# Patient Record
Sex: FEMALE | Race: BLACK OR AFRICAN AMERICAN | Employment: FULL TIME | URBAN - METROPOLITAN AREA
[De-identification: names, ages, dates, MRNs, and addresses within clinical notes are randomized per-mention and may not be internally consistent; named-entity substitution may affect disease eponyms.]

---

## 2023-02-20 ENCOUNTER — APPOINTMENT (OUTPATIENT)
Dept: GENERAL RADIOLOGY | Age: 38
End: 2023-02-20
Payer: COMMERCIAL

## 2023-02-20 ENCOUNTER — HOSPITAL ENCOUNTER (EMERGENCY)
Age: 38
Discharge: HOME | End: 2023-02-20
Attending: EMERGENCY MEDICINE
Payer: COMMERCIAL

## 2023-02-20 VITALS
WEIGHT: 247 LBS | RESPIRATION RATE: 14 BRPM | TEMPERATURE: 98.1 F | BODY MASS INDEX: 39.7 KG/M2 | DIASTOLIC BLOOD PRESSURE: 63 MMHG | OXYGEN SATURATION: 100 % | SYSTOLIC BLOOD PRESSURE: 131 MMHG | HEIGHT: 66 IN | HEART RATE: 80 BPM

## 2023-02-20 DIAGNOSIS — M79.605 LEFT LEG PAIN: Primary | ICD-10-CM

## 2023-02-20 LAB — HCG(URINE) PREGNANCY TEST: NEGATIVE

## 2023-02-20 PROCEDURE — 6360000002 HC RX W HCPCS: Performed by: EMERGENCY MEDICINE

## 2023-02-20 PROCEDURE — 96372 THER/PROPH/DIAG INJ SC/IM: CPT

## 2023-02-20 PROCEDURE — 73502 X-RAY EXAM HIP UNI 2-3 VIEWS: CPT

## 2023-02-20 PROCEDURE — 84703 CHORIONIC GONADOTROPIN ASSAY: CPT

## 2023-02-20 PROCEDURE — 99284 EMERGENCY DEPT VISIT MOD MDM: CPT

## 2023-02-20 PROCEDURE — 6370000000 HC RX 637 (ALT 250 FOR IP): Performed by: EMERGENCY MEDICINE

## 2023-02-20 RX ORDER — METHOCARBAMOL 750 MG/1
750-1500 TABLET, FILM COATED ORAL 3 TIMES DAILY
Qty: 15 TABLET | Refills: 0 | Status: SHIPPED | OUTPATIENT
Start: 2023-02-20 | End: 2023-02-23 | Stop reason: SDUPTHER

## 2023-02-20 RX ORDER — KETOROLAC TROMETHAMINE 30 MG/ML
30 INJECTION, SOLUTION INTRAMUSCULAR; INTRAVENOUS ONCE
Status: COMPLETED | OUTPATIENT
Start: 2023-02-20 | End: 2023-02-20

## 2023-02-20 RX ORDER — PREDNISONE 20 MG/1
40 TABLET ORAL DAILY
Qty: 8 TABLET | Refills: 0 | Status: SHIPPED | OUTPATIENT
Start: 2023-02-20 | End: 2023-02-24

## 2023-02-20 RX ORDER — PREDNISONE 20 MG/1
60 TABLET ORAL ONCE
Status: COMPLETED | OUTPATIENT
Start: 2023-02-20 | End: 2023-02-20

## 2023-02-20 RX ADMIN — PREDNISONE 60 MG: 20 TABLET ORAL at 22:59

## 2023-02-20 RX ADMIN — KETOROLAC TROMETHAMINE 30 MG: 30 INJECTION, SOLUTION INTRAMUSCULAR at 21:49

## 2023-02-20 ASSESSMENT — PAIN - FUNCTIONAL ASSESSMENT
PAIN_FUNCTIONAL_ASSESSMENT: 0-10
PAIN_FUNCTIONAL_ASSESSMENT: 0-10
PAIN_FUNCTIONAL_ASSESSMENT: PREVENTS OR INTERFERES SOME ACTIVE ACTIVITIES AND ADLS
PAIN_FUNCTIONAL_ASSESSMENT: ACTIVITIES ARE NOT PREVENTED

## 2023-02-20 ASSESSMENT — PAIN DESCRIPTION - DESCRIPTORS
DESCRIPTORS: SHOOTING
DESCRIPTORS: SHOOTING

## 2023-02-20 ASSESSMENT — PAIN DESCRIPTION - PAIN TYPE
TYPE: ACUTE PAIN
TYPE: ACUTE PAIN

## 2023-02-20 ASSESSMENT — PAIN DESCRIPTION - FREQUENCY
FREQUENCY: INTERMITTENT
FREQUENCY: INTERMITTENT

## 2023-02-20 ASSESSMENT — PAIN DESCRIPTION - ORIENTATION
ORIENTATION: LEFT
ORIENTATION: LEFT

## 2023-02-20 ASSESSMENT — PAIN DESCRIPTION - ONSET: ONSET: SUDDEN

## 2023-02-20 ASSESSMENT — PAIN DESCRIPTION - LOCATION
LOCATION: LEG;HIP
LOCATION: LEG

## 2023-02-20 ASSESSMENT — PAIN SCALES - GENERAL
PAINLEVEL_OUTOF10: 8
PAINLEVEL_OUTOF10: 4

## 2023-02-20 NOTE — Clinical Note
Otoniel Edmonds was seen and treated in our emergency department on 2/20/2023. She may return to work on 02/23/2023. If you have any questions or concerns, please don't hesitate to call.       Lee Clancy MD

## 2023-02-21 ENCOUNTER — HOSPITAL ENCOUNTER (OUTPATIENT)
Dept: VASCULAR LAB | Age: 38
Discharge: HOME | End: 2023-02-21
Payer: COMMERCIAL

## 2023-02-21 DIAGNOSIS — M79.605 LEFT LEG PAIN: ICD-10-CM

## 2023-02-21 PROCEDURE — 93971 EXTREMITY STUDY: CPT

## 2023-02-21 NOTE — ED NOTES
Patient prepared for and ready to be discharged. Patient discharged at this time in no acute distress after verbalizing understanding of discharge instructions. Patient left after receiving After Visit Summary instructions.         Rosy Smyth RN  02/20/23 6839

## 2023-02-21 NOTE — ED PROVIDER NOTES
[facility] Emergency Department      Pt Name: Sae Torre  MRN: [id number]  [place] 1985  Date of evaluation: 2/20/2023  Provider: Nicky Saez MD  CHIEF COMPLAINT  Chief Complaint   Patient presents with    Leg Pain     Leg pain     HPI  Sae Torre is a 41 y.o. female who presents because of leg pain. She has had pain for the past several days. It is in her left hip area and radiates down the lateral part of her leg. Denies any known injury. It does feel worse to sit down and then stand up. She denies any numbness. Denies any abdominal pain. Denies any loss of bowel or bladder control. Denies fever. She did wait to the end of her shift at work and came to the ER for evaluation. She does have a history of DVT during pregnancy and has been off of blood thinners since the end of her pregnancy. REVIEW OF SYSTEMS:  No fever, no cough, no abdominal pain, no dysuria Pertinent positives and negatives as per the HPI. All other pertinent review of systems reviewed and negative. Nursing notes reviewed. PAST MEDICAL HISTORY  Past Medical History:   Diagnosis Date    Kidney infection     UTI (urinary tract infection)      SURGICAL HISTORY  History reviewed. No pertinent surgical history. MEDICATIONS:  No current facility-administered medications on file prior to encounter. Current Outpatient Medications on File Prior to Encounter   Medication Sig Dispense Refill    naproxen (NAPROSYN) 500 MG tablet Take 1 tablet by mouth 2 times daily (Patient not taking: Reported on 2/20/2023) 60 tablet 0    ibuprofen (ADVIL;MOTRIN) 600 MG tablet Take 1 tablet by mouth every 6 hours as needed for Pain (Patient not taking: Reported on 2/20/2023) 30 tablet 0    etonogestrel-ethinyl estradiol (NUVARING) 0.12-0.015 MG/24HR vaginal ring Place 1 each vaginally See Admin Instructions. (Patient not taking: Reported on 2/20/2023)       ALLERGIES  Corylus and Nuts [peanut oil]  FAMILY HISTORY:  History reviewed. No pertinent family history. SOCIAL HISTORY:  Social History     Tobacco Use    Smoking status: Never   Substance Use Topics    Alcohol use: Yes     Comment: socially    Drug use: No     IMMUNIZATIONS:  Noncontributory    PHYSICAL EXAM  VITAL SIGNS:  Blood pressure 131/63, pulse 80, temperature 98.1 °F (36.7 °C), temperature source Oral, resp. rate 14, height 5' 6\" (1.676 m), weight 247 lb (112 kg), last menstrual period 02/11/2023, SpO2 100 %. Constitutional:  41 y.o. female who does not appear toxic or acutely ill  HENT:  Atraumatic, mucous membranes moist  Eyes:   Conjunctiva clear, no icterus  Neck:  Supple, no visible JVD  Cardiovascular:  Regular, no rubs  Thorax & Lungs:  No accessory muscle usage, clear  Abdomen:  Soft, non distended, NT  Back:  No deformity  Genitalia:  Deferred  Rectal:  Deferred  Extremities:  No cyanosis, no edema, no palpable venous cords, pain with flex, extension  Skin:  Warm, dry  Neurologic:  Alert, no slurred speech, light touch sensation intact, df/pf normal, steady gait, pain with straight leg raise  Psychiatric:  Affect appropriate    RESULTS / MEDICAL DECISION MAKING:  Labs resulted at the time of this note reviewed. Labs Reviewed   PREGNANCY, URINE     RADIOLOGY:  Plain x-rays were viewed by me:   XR HIP 2-3 VW W PELVIS LEFT   Final Result      No acute fracture seen. VL Extremity Venous Left    (Results Pending)     ED COURSE:    Medications   ketorolac (TORADOL) injection 30 mg (30 mg IntraMUSCular Given 2/20/23 2149)   predniSONE (DELTASONE) tablet 60 mg (60 mg Oral Given 2/20/23 2259)     History from : Patient  Limitations to history : None  Chronic Conditions:  has a past medical history of Kidney infection and UTI (urinary tract infection). Records Reviewed :  Outpatient Notes    Disposition Considerations (Tests not ordered but considered, Shared Decision Making, Pt Expectation of Test or Tx.):  MRI not deemed clinically necessary in the ED setting  Discussion with Other Profesionals : None    PROCEDURES:  None    CRITICAL CARE:  None    CONSULTATIONS:  None    Riki Alberto is a 41 y.o. female who presented because of leg pain. She is here during timeframe that we are not able to obtain official Doppler ultrasound of her leg at the [facility] hospital.  We advised her to go there tomorrow during hours when the test is available. There are no clinical findings to suggest of DVT, vascular occlusion or dissection, compartment syndrome, infectious process, fracture, etc.  Other differentials include but not limited to peripheral neuropathic pain, strain, sprain, arthritis, bursitis, tendonitis, contusion, spasm, radiculopathy. Riki Alberto was given appropriate discharge instructions. Referral to follow up provider. Discharge Medication List as of 2/20/2023 10:41 PM        START taking these medications    Details   predniSONE (DELTASONE) 20 MG tablet Take 2 tablets by mouth daily for 4 days, Disp-8 tablet, R-0Print      methocarbamol (ROBAXIN-750) 750 MG tablet Take 1-2 tablets by mouth 3 times daily for 15 doses, Disp-15 tablet, R-0Print           FOLLOW UP:    [place] [id number]  [phone]    Schedule an appointment as soon as possible for a visit       FINAL IMPRESSION:    1. Left leg pain        (Please note that I used voice recognition software to generate this note.   Occasionally words are mistranscribed despite my efforts to edit errors.)        Jae Laguna MD  02/21/23 0030

## 2023-02-23 ENCOUNTER — OFFICE VISIT (OUTPATIENT)
Dept: INTERNAL MEDICINE CLINIC | Age: 38
End: 2023-02-23
Payer: COMMERCIAL

## 2023-02-23 ENCOUNTER — HOSPITAL ENCOUNTER (OUTPATIENT)
Dept: GENERAL RADIOLOGY | Age: 38
Discharge: HOME | End: 2023-02-23
Payer: COMMERCIAL

## 2023-02-23 VITALS
BODY MASS INDEX: 39.74 KG/M2 | HEIGHT: 66 IN | TEMPERATURE: 97.8 F | SYSTOLIC BLOOD PRESSURE: 138 MMHG | RESPIRATION RATE: 16 BRPM | HEART RATE: 83 BPM | OXYGEN SATURATION: 98 % | DIASTOLIC BLOOD PRESSURE: 82 MMHG | WEIGHT: 247.3 LBS

## 2023-02-23 DIAGNOSIS — M54.10 BACK PAIN WITH LEFT-SIDED RADICULOPATHY: ICD-10-CM

## 2023-02-23 DIAGNOSIS — M54.10 BACK PAIN WITH LEFT-SIDED RADICULOPATHY: Primary | ICD-10-CM

## 2023-02-23 DIAGNOSIS — Z00.00 HEALTH CARE MAINTENANCE: ICD-10-CM

## 2023-02-23 DIAGNOSIS — E03.9 HYPOTHYROID IN PREGNANCY, ANTEPARTUM: ICD-10-CM

## 2023-02-23 DIAGNOSIS — O99.280 HYPOTHYROID IN PREGNANCY, ANTEPARTUM: ICD-10-CM

## 2023-02-23 LAB
ANION GAP SERPL CALCULATED.3IONS-SCNC: 12 MMOL/L (ref 3–16)
BASOPHILS ABSOLUTE: 0 K/UL (ref 0–0.2)
BASOPHILS RELATIVE PERCENT: 0 %
BUN BLDV-MCNC: 16 MG/DL (ref 7–20)
CALCIUM SERPL-MCNC: 9.4 MG/DL (ref 8.3–10.6)
CHLORIDE BLD-SCNC: 106 MMOL/L (ref 99–110)
CO2: 25 MMOL/L (ref 21–32)
CREAT SERPL-MCNC: 0.9 MG/DL (ref 0.6–1.1)
EOSINOPHILS ABSOLUTE: 0.1 K/UL (ref 0–0.6)
EOSINOPHILS RELATIVE PERCENT: 1 %
GFR SERPL CREATININE-BSD FRML MDRD: >60 ML/MIN/{1.73_M2}
GLUCOSE BLD-MCNC: 98 MG/DL (ref 70–99)
HCT VFR BLD CALC: 36.6 % (ref 36–48)
HEMATOLOGY PATH CONSULT: YES
HEMOGLOBIN: 11.4 G/DL (ref 12–16)
LYMPHOCYTES ABSOLUTE: 5.9 K/UL (ref 1–5.1)
LYMPHOCYTES RELATIVE PERCENT: 45 %
MCH RBC QN AUTO: 26.5 PG (ref 26–34)
MCHC RBC AUTO-ENTMCNC: 31.1 G/DL (ref 31–36)
MCV RBC AUTO: 85.4 FL (ref 80–100)
MONOCYTES ABSOLUTE: 0.4 K/UL (ref 0–1.3)
MONOCYTES RELATIVE PERCENT: 3 %
NEUTROPHILS ABSOLUTE: 6.7 K/UL (ref 1.7–7.7)
NEUTROPHILS RELATIVE PERCENT: 51 %
PDW BLD-RTO: 15 % (ref 12.4–15.4)
PLATELET # BLD: 501 K/UL (ref 135–450)
PLATELET SLIDE REVIEW: ABNORMAL
PMV BLD AUTO: 7.4 FL (ref 5–10.5)
POTASSIUM SERPL-SCNC: 4.1 MMOL/L (ref 3.5–5.1)
RBC # BLD: 4.28 M/UL (ref 4–5.2)
RBC # BLD: NORMAL 10*6/UL
SLIDE REVIEW: ABNORMAL
SODIUM BLD-SCNC: 143 MMOL/L (ref 136–145)
WBC # BLD: 13.1 K/UL (ref 4–11)

## 2023-02-23 PROCEDURE — 6360000002 HC RX W HCPCS

## 2023-02-23 PROCEDURE — 72100 X-RAY EXAM L-S SPINE 2/3 VWS: CPT

## 2023-02-23 PROCEDURE — 99213 OFFICE O/P EST LOW 20 MIN: CPT

## 2023-02-23 RX ORDER — KETOROLAC TROMETHAMINE 30 MG/ML
30 INJECTION, SOLUTION INTRAMUSCULAR; INTRAVENOUS ONCE
Status: COMPLETED | OUTPATIENT
Start: 2023-02-23 | End: 2023-02-23

## 2023-02-23 RX ORDER — METHOCARBAMOL 750 MG/1
750-1500 TABLET, FILM COATED ORAL 3 TIMES DAILY
Qty: 90 TABLET | Refills: 0 | Status: SHIPPED | OUTPATIENT
Start: 2023-02-23 | End: 2023-03-10

## 2023-02-23 RX ORDER — TRIAMCINOLONE ACETONIDE 40 MG/ML
40 INJECTION, SUSPENSION INTRA-ARTICULAR; INTRAMUSCULAR ONCE
Status: COMPLETED | OUTPATIENT
Start: 2023-02-23 | End: 2023-02-23

## 2023-02-23 RX ADMIN — KETOROLAC TROMETHAMINE 30 MG: 30 INJECTION, SOLUTION INTRAMUSCULAR; INTRAVENOUS at 09:48

## 2023-02-23 RX ADMIN — TRIAMCINOLONE ACETONIDE 40 MG: 40 INJECTION, SUSPENSION INTRA-ARTICULAR; INTRAMUSCULAR at 09:48

## 2023-02-23 ASSESSMENT — PAIN DESCRIPTION - LOCATION: LOCATION: LEG

## 2023-02-23 ASSESSMENT — ENCOUNTER SYMPTOMS
NAUSEA: 0
BACK PAIN: 1
CHEST TIGHTNESS: 0
VOMITING: 0
SHORTNESS OF BREATH: 0

## 2023-02-23 ASSESSMENT — PAIN DESCRIPTION - ORIENTATION: ORIENTATION: LEFT;ANTERIOR;DISTAL

## 2023-02-23 ASSESSMENT — PAIN SCALES - GENERAL: PAINLEVEL_OUTOF10: 6

## 2023-02-23 ASSESSMENT — PAIN DESCRIPTION - DESCRIPTORS: DESCRIPTORS: ACHING;NUMBNESS;SHOOTING

## 2023-02-23 NOTE — PROGRESS NOTES
The [facility]    Darlene Maier is a 41 y.o. female, here for evaluation of the following concerns:    HPI  45-year-old female with past medical history UTI, obesity, A0 hypothyroid and DVT during pregnancy presents to clinic as follow-up from recent ED visit. Patient states she is from sleep  with sudden onset, moderate to severe, shooting, throbbing pain to her left lower back hip radiating to her anterior left thigh. Pain exacerbated with sitting and somewhat relieved with standing, although she states pain radiates further down her leg when standing. She denies any history of similar symptoms or recent injury/falls. She does states she was dancing with friends Saturday night, but denies any strenuous activity. Pain continued without relief on the following day causing significant difficulty at work. She was seen in the ED that evening where she received Toradol shot with minimal relief and had x-ray of left hip which was unremarkable. She was discharged on several days 40 prednisone as well as Robaxin. States pain is somewhat improved since then and presents now for further evaluation. Of note, due to patient's history of DVT during pregnancy she was referred for left lower extremity Doppler which was negative. Patient sitting in chair comfortably on assessment. She endorses continued left hip pain radiating to her anterior left thigh. She additionally states she noticed numbness to her distal anterior left thigh during her Doppler examination. Review of Systems   Constitutional:  Negative for chills and fever. Respiratory:  Negative for chest tightness and shortness of breath. Gastrointestinal:  Negative for nausea and vomiting. Musculoskeletal:  Positive for back pain. Negative for gait problem, joint swelling and neck pain. Neurological:  Positive for numbness.    All other systems reviewed and are negative. MEDICATIONS:  Prior to Visit Medications    Medication Sig Taking? Authorizing Provider   methocarbamol (ROBAXIN-750) 750 MG tablet Take 1-2 tablets by mouth 3 times daily for 15 days Yes Danae Oshea MD   predniSONE (DELTASONE) 20 MG tablet Take 2 tablets by mouth daily for 4 days Yes Lee Clancy MD        Vitals:    02/23/23 0824   BP: 138/82   Site: Right Upper Arm   Position: Sitting   Cuff Size: Large Adult   Pulse: 83   Resp: 16   Temp: 97.8 °F (36.6 °C)   TempSrc: Temporal   SpO2: 98%   Weight: 247 lb 4.8 oz (112.2 kg)   Height: 5' 6.25\" (1.683 m)      Estimated body mass index is 39.61 kg/m² as calculated from the following:    Height as of this encounter: 5' 6.25\" (1.683 m). Weight as of this encounter: 247 lb 4.8 oz (112.2 kg). Physical Exam  Vitals and nursing note reviewed. Constitutional:       General: She is not in acute distress. Appearance: Normal appearance. She is obese. She is not ill-appearing or diaphoretic. HENT:      Head: Normocephalic. Nose: Nose normal.      Mouth/Throat:      Mouth: Mucous membranes are moist.      Pharynx: Oropharynx is clear. Cardiovascular:      Rate and Rhythm: Normal rate and regular rhythm. Pulses: Normal pulses. Heart sounds: Normal heart sounds. No murmur heard. No friction rub. No gallop. Pulmonary:      Effort: Pulmonary effort is normal. No respiratory distress. Breath sounds: Normal breath sounds. No wheezing, rhonchi or rales. Abdominal:      General: Abdomen is flat. There is no distension. Palpations: Abdomen is soft. Musculoskeletal:      Right lower leg: No edema. Left lower leg: No edema. Comments: Mild tenderness over lateral and anterior L hip. Pain reproduced with L straight leg raise and external hip rotation. Full ROM without pain on R. Skin:     General: Skin is warm and dry. Coloration: Skin is not pale.    Neurological:      Mental Status: She is alert and oriented to person, place, and time. Comments: Numbness over distal anterior L thigh       ASSESSMENT/PLAN:     1. Back pain with left-sided radiculopathy  Assessment & Plan:  - Reproduced with straight leg raise, external hip rotation  - Discussed diet, exercise and encouraged lifestyle changes  - Provided sciatica exercise instructions  - Close follow up; discussed signs/symptoms requiring medical evaluation  Orders:  -     XR LUMBAR SPINE (2-3 VIEWS); Future  2. Hypothyroid in pregnancy, antepartum  Assessment & Plan:  - Previously resolved  - Recheck TSH  Orders:  -     TSH with Reflex; Future  3. Health care maintenance  Assessment & Plan:  - Obtain CBC, BMP, A1c  Orders:  -     CBC with Auto Differential; Future  -     Basic Metabolic Panel; Future      Return in about 2 weeks (around 3/9/2023). The patient was staffed with teaching attending: Dr. Sonja Springer. An electronic signature was used to authenticate this note.     --Citlali Long MD

## 2023-02-23 NOTE — PATIENT INSTRUCTIONS
Thank you for visiting the [address] [place] [address] Ave.. The following issues were addressed and changes made as follows:    Lumbar back pain   Kenolog/Ketorolac intramucular injection  Refill Robaxin   Obtain Lumbar Xray  Encouraged weight loss, exercises    Follow up has been arranged for you in 2 weeks. Please contact the clinic with any questions/concerns or difficulties obtaining your medications.

## 2023-02-23 NOTE — ASSESSMENT & PLAN NOTE
- Reproduced with straight leg raise, external hip rotation  - Discussed diet, exercise and encouraged lifestyle changes  - Provided sciatica exercise instructions  - Close follow up; discussed signs/symptoms requiring medical evaluation

## 2023-02-24 LAB
HEMATOLOGY PATH CONSULT: NORMAL
TSH REFLEX: 1.7 UIU/ML (ref 0.27–4.2)

## 2023-03-09 ENCOUNTER — OFFICE VISIT (OUTPATIENT)
Dept: INTERNAL MEDICINE CLINIC | Age: 38
End: 2023-03-09
Payer: COMMERCIAL

## 2023-03-09 VITALS
TEMPERATURE: 97.5 F | RESPIRATION RATE: 16 BRPM | HEIGHT: 66 IN | DIASTOLIC BLOOD PRESSURE: 78 MMHG | BODY MASS INDEX: 39.66 KG/M2 | OXYGEN SATURATION: 99 % | HEART RATE: 83 BPM | SYSTOLIC BLOOD PRESSURE: 115 MMHG | WEIGHT: 246.8 LBS

## 2023-03-09 DIAGNOSIS — M54.16 LUMBAR RADICULOPATHY: Primary | ICD-10-CM

## 2023-03-09 PROCEDURE — 99213 OFFICE O/P EST LOW 20 MIN: CPT

## 2023-03-09 ASSESSMENT — ENCOUNTER SYMPTOMS: BACK PAIN: 0

## 2023-03-09 NOTE — PROGRESS NOTES
The [facility] Outpatient [facility]    Gil Client is a 41 y.o. female, here for evaluation of the following concerns:    HPI  65-year-old female with past medical history UTI, obesity, G6 hypothyroid and DVT during pregnancy presents to clinic as follow-up for her back pain. Patient was last seen 2 weeks ago for lower back and left lower extremity pain accompanied by left lower extremity paresthesias. She was educated on taking her Robaxin, given Kenalog/Toradol shot, and provided instructions on home exercises and stretches. She states that her pain has since improved significantly. She denies any pain unless she has a long strenuous day at work. However, she continues to complain of left medial thigh paresthesias. Symptoms are constant and have no exacerbating or relieving factors. She denies any incontinence, saddle anesthesia, lower extremity weakness. Discussed options including continued exercises and stretching as well as MRI to evaluate for possible stenosis. She would like to obtain MRI at this time and will follow-up with us after obtaining images. No other complaints or concerns at this time. Review of Systems   Musculoskeletal:  Negative for back pain, gait problem and myalgias. Neurological:  Positive for numbness. Negative for weakness. All other systems reviewed and are negative. MEDICATIONS:  Prior to Visit Medications    Medication Sig Taking?  Authorizing Provider   methocarbamol (ROBAXIN-750) 750 MG tablet Take 1-2 tablets by mouth 3 times daily for 15 days Yes Rodriguez Schultz MD        Vitals:    03/09/23 0828   BP: 115/78   Site: Right Upper Arm   Position: Sitting   Cuff Size: Large Adult   Pulse: 83   Resp: 16   Temp: 97.5 °F (36.4 °C)   TempSrc: Temporal   SpO2: 99%   Weight: 246 lb 12.8 oz (111.9 kg)   Height: 5' 6.25\" (1.683 m)      Estimated body mass index is 39.53 kg/m² as calculated from the following:    Height as of this encounter: 5' 6.25\" (1.683 m). Weight as of this encounter: 246 lb 12.8 oz (111.9 kg). Physical Exam  Vitals and nursing note reviewed. Constitutional:       Appearance: Normal appearance. HENT:      Head: Normocephalic. Nose: Nose normal.      Mouth/Throat:      Mouth: Mucous membranes are moist.      Pharynx: Oropharynx is clear. Cardiovascular:      Rate and Rhythm: Normal rate and regular rhythm. Pulses: Normal pulses. Heart sounds: Normal heart sounds. No murmur heard. No friction rub. No gallop. Pulmonary:      Effort: Pulmonary effort is normal. No respiratory distress. Breath sounds: Normal breath sounds. No wheezing, rhonchi or rales. Abdominal:      General: Abdomen is flat. There is no distension. Musculoskeletal:      Right lower leg: No edema. Left lower leg: No edema. Skin:     General: Skin is warm and dry. Neurological:      Mental Status: She is alert and oriented to person, place, and time. Comments: Subjective medial proximal LLE numbness       ASSESSMENT/PLAN:     1. Lumbar radiculopathy  Assessment & Plan:  - Pain controlled at this time  - Continue Robaxin, exercises  - Obtain MRI, f/u after in clinic  Orders:  -     MRI [address]; Future    Return if symptoms worsen or fail to improve. Patient will call to arrange follow up after obtaining MRI. The patient was staffed with teaching attending: Dr. Saadia Houston. An electronic signature was used to authenticate this note.     --Stan Egan MD

## 2023-03-09 NOTE — PATIENT INSTRUCTIONS
Thank you for visiting the [address] [place] [address] Ave.. The following issues were addressed and changes made as follows:    Lumbar radiculopathy  - MRI ordered     Please call the clinic to schedule follow up after you obtain your MRI scan. Please contact the clinic with any questions/concerns or difficulties obtaining your medications.

## 2023-03-17 ENCOUNTER — HOSPITAL ENCOUNTER (OUTPATIENT)
Dept: MRI IMAGING | Age: 38
Discharge: HOME | End: 2023-03-17
Payer: COMMERCIAL

## 2023-03-17 DIAGNOSIS — M54.16 LUMBAR RADICULOPATHY: ICD-10-CM

## 2023-03-17 PROCEDURE — 72148 MRI LUMBAR SPINE W/O DYE: CPT

## 2023-03-25 PROBLEM — Z00.00 HEALTH CARE MAINTENANCE: Status: RESOLVED | Noted: 2023-02-23 | Resolved: 2023-03-25

## 2023-05-31 ENCOUNTER — TELEPHONE (OUTPATIENT)
Dept: INTERNAL MEDICINE CLINIC | Age: 38
End: 2023-05-31

## 2023-06-08 ENCOUNTER — OFFICE VISIT (OUTPATIENT)
Dept: INTERNAL MEDICINE CLINIC | Age: 38
End: 2023-06-08
Payer: COMMERCIAL

## 2023-06-08 VITALS
DIASTOLIC BLOOD PRESSURE: 74 MMHG | OXYGEN SATURATION: 99 % | BODY MASS INDEX: 36.75 KG/M2 | TEMPERATURE: 97.1 F | WEIGHT: 229.4 LBS | RESPIRATION RATE: 16 BRPM | SYSTOLIC BLOOD PRESSURE: 113 MMHG | HEART RATE: 83 BPM

## 2023-06-08 DIAGNOSIS — Z00.00 HEALTHCARE MAINTENANCE: ICD-10-CM

## 2023-06-08 DIAGNOSIS — M54.16 LUMBAR RADICULOPATHY: Primary | ICD-10-CM

## 2023-06-08 LAB — HBA1C MFR BLD: 4.9 %

## 2023-06-08 PROCEDURE — 99213 OFFICE O/P EST LOW 20 MIN: CPT

## 2023-06-08 PROCEDURE — 83036 HEMOGLOBIN GLYCOSYLATED A1C: CPT

## 2023-06-08 NOTE — PROGRESS NOTES
to light. Cardiovascular:      Rate and Rhythm: Normal rate and regular rhythm. Pulses: Normal pulses. Heart sounds: Normal heart sounds. Pulmonary:      Effort: Pulmonary effort is normal.      Breath sounds: Normal breath sounds. Abdominal:      General: There is no distension. Palpations: Abdomen is soft. Tenderness: There is no abdominal tenderness. Musculoskeletal:         General: Normal range of motion. Right lower leg: No edema. Left lower leg: No edema. Skin:     General: Skin is warm. Neurological:      General: No focal deficit present. Mental Status: She is alert and oriented to person, place, and time. Psychiatric:         Mood and Affect: Mood normal.         Thought Content: Thought content normal.       Assessment & Plan:      1. Lumbar radiculopathy  MRI lumbar spine without contrast with no significant lumbar spinal canal stenosis, mild to moderate left L3-L4 and mild bilateral L4-L5 neural foraminal narrowing  Pain is well controlled at this time  -Continue using Robaxin as needed  -Advised patient on back strengthening exercises  -Advised on correct posture      Return in about 4 months (around 10/8/2023).     Dispo: Pt has been staffed with Dr. Angelika White MD  _______________  Karina Velazquez MD, 6/8/2023 1:48 PM   PGY-2

## 2023-09-22 ENCOUNTER — HOSPITAL ENCOUNTER (EMERGENCY)
Age: 38
Discharge: HOME | End: 2023-09-22
Payer: COMMERCIAL

## 2023-09-22 VITALS
SYSTOLIC BLOOD PRESSURE: 140 MMHG | HEART RATE: 89 BPM | HEIGHT: 65 IN | BODY MASS INDEX: 38.24 KG/M2 | TEMPERATURE: 98 F | RESPIRATION RATE: 16 BRPM | WEIGHT: 229.5 LBS | DIASTOLIC BLOOD PRESSURE: 98 MMHG | OXYGEN SATURATION: 99 %

## 2023-09-22 DIAGNOSIS — H18.892 CORNEAL IRRITATION OF LEFT EYE: Primary | ICD-10-CM

## 2023-09-22 PROCEDURE — 99283 EMERGENCY DEPT VISIT LOW MDM: CPT

## 2023-09-22 PROCEDURE — 2500000003 HC RX 250 WO HCPCS

## 2023-09-22 PROCEDURE — 6370000000 HC RX 637 (ALT 250 FOR IP)

## 2023-09-22 RX ORDER — ERYTHROMYCIN 5 MG/G
OINTMENT OPHTHALMIC
Qty: 1 EACH | Refills: 0 | Status: SHIPPED | OUTPATIENT
Start: 2023-09-22 | End: 2023-10-02

## 2023-09-22 RX ORDER — ERYTHROMYCIN 5 MG/G
OINTMENT OPHTHALMIC ONCE
Status: COMPLETED | OUTPATIENT
Start: 2023-09-22 | End: 2023-09-22

## 2023-09-22 RX ORDER — ACETAMINOPHEN 325 MG/1
650 TABLET ORAL ONCE
Status: COMPLETED | OUTPATIENT
Start: 2023-09-22 | End: 2023-09-22

## 2023-09-22 RX ORDER — PROPARACAINE HYDROCHLORIDE 5 MG/ML
1 SOLUTION/ DROPS OPHTHALMIC ONCE
Status: COMPLETED | OUTPATIENT
Start: 2023-09-22 | End: 2023-09-22

## 2023-09-22 RX ADMIN — ERYTHROMYCIN: 5 OINTMENT OPHTHALMIC at 08:50

## 2023-09-22 RX ADMIN — PROPARACAINE HYDROCHLORIDE 1 DROP: 5 SOLUTION/ DROPS OPHTHALMIC at 06:39

## 2023-09-22 RX ADMIN — ACETAMINOPHEN 650 MG: 325 TABLET ORAL at 06:39

## 2023-09-22 RX ADMIN — FLUORESCEIN SODIUM 1 MG: 1 STRIP OPHTHALMIC at 07:00

## 2023-09-22 ASSESSMENT — VISUAL ACUITY
OS: 20/40
OD: 20/15
OU: 20/30

## 2023-09-22 ASSESSMENT — PAIN SCALES - GENERAL: PAINLEVEL_OUTOF10: 10

## 2023-09-22 ASSESSMENT — PAIN DESCRIPTION - ORIENTATION: ORIENTATION: LEFT

## 2023-09-22 ASSESSMENT — PAIN - FUNCTIONAL ASSESSMENT: PAIN_FUNCTIONAL_ASSESSMENT: NONE - DENIES PAIN

## 2023-09-22 ASSESSMENT — PAIN DESCRIPTION - LOCATION: LOCATION: EYE

## 2023-09-22 NOTE — ED PROVIDER NOTES
[address] [facility] [address] [address]        Pt Name: Pascual Peña  MRN: [id number]  [address] [facility] 1985  Date of evaluation: 9/22/2023  Provider: CONNIE Yuan - ANDRÉS  PCP: Evette Escalante MD  Note Started: 6:42 AM EDT 9/22/23      DIONNA. I have evaluated this patient. CHIEF COMPLAINT       Chief Complaint   Patient presents with    Eye Pain     Patient presents to ED for c/o possible hair spray in left eye from Wednesday evening. Patient with c/o pain, blurred vision and sensitivity to light. Patient states she has tried flushing her eye, took her contacts out and took her lashes off, but still having pain. HISTORY OF PRESENT ILLNESS: 1 or more Elements     History From: pt            Chief Complaint:above    Pascual Peña is a 41 y.o. female who  has a past medical history of Kidney infection and UTI (urinary tract infection). presents to ed with left eye pain. Started Wednesday. Was getting hair done and got some type of spray which makes hair \"firm\" . Does not know exactly what it was. Now having pain and worsening photophobia. Nothing makes symptoms better. Light makes worse    The patient  reports that she has never smoked. She has never used smokeless tobacco. She reports current alcohol use. She reports that she does not use drugs. Nursing Notes were all reviewed and agreed with or any disagreements were addressed in the HPI. REVIEW OF SYSTEMS :      Review of Systems    Positives and Pertinent negatives as per HPI. SURGICAL HISTORY   History reviewed. No pertinent surgical history.  [facility]       Discharge Medication List as of 9/22/2023  9:47 AM          ALLERGIES     Other    FAMILYHISTORY     History reviewed. No pertinent family history. SOCIAL HISTORY       Social History     Tobacco Use    Smoking status: Never    Smokeless tobacco: Never   Substance Use Topics    Alcohol use:  Yes

## 2023-09-22 NOTE — DISCHARGE INSTRUCTIONS
Please [facility] [phone] [facility] and tell them you were seen in the ER. Please avoid using contact lenses as we discussed.   Return for any worsening symptoms as we discussed

## 2023-09-22 NOTE — ED NOTES
Left eye irrigated with 1 L NS through nilsa lens. Pt states left eye feels better but is still light sensitive and it still feels like something is in her eye.        Luh Huerta RN  09/22/23 3098